# Patient Record
Sex: FEMALE | Employment: FULL TIME | URBAN - METROPOLITAN AREA
[De-identification: names, ages, dates, MRNs, and addresses within clinical notes are randomized per-mention and may not be internally consistent; named-entity substitution may affect disease eponyms.]

---

## 2017-04-02 ENCOUNTER — HOSPITAL ENCOUNTER (OUTPATIENT)
Age: 47
Discharge: HOME | End: 2017-04-02
Attending: EMERGENCY MEDICINE
Payer: COMMERCIAL

## 2017-04-02 VITALS
HEIGHT: 68 IN | OXYGEN SATURATION: 99 % | BODY MASS INDEX: 27.28 KG/M2 | RESPIRATION RATE: 20 BRPM | SYSTOLIC BLOOD PRESSURE: 113 MMHG | HEART RATE: 82 BPM | TEMPERATURE: 98 F | WEIGHT: 180 LBS | DIASTOLIC BLOOD PRESSURE: 62 MMHG

## 2017-04-02 DIAGNOSIS — L03.213 PERIORBITAL CELLULITIS OF LEFT EYE: ICD-10-CM

## 2017-04-02 DIAGNOSIS — H10.32 ACUTE BACTERIAL CONJUNCTIVITIS OF LEFT EYE: Primary | ICD-10-CM

## 2017-04-02 PROCEDURE — 99214 OFFICE O/P EST MOD 30 MIN: CPT

## 2017-04-02 PROCEDURE — 87430 STREP A AG IA: CPT

## 2017-04-02 PROCEDURE — 99213 OFFICE O/P EST LOW 20 MIN: CPT

## 2017-04-02 RX ORDER — CIPROFLOXACIN HYDROCHLORIDE 3.5 MG/ML
2 SOLUTION/ DROPS TOPICAL
Qty: 1 BOTTLE | Refills: 0 | Status: SHIPPED | OUTPATIENT
Start: 2017-04-02 | End: 2017-04-12

## 2017-04-02 RX ORDER — AMOXICILLIN AND CLAVULANATE POTASSIUM 875; 125 MG/1; MG/1
1 TABLET, FILM COATED ORAL 2 TIMES DAILY
Qty: 20 TABLET | Refills: 0 | Status: SHIPPED | OUTPATIENT
Start: 2017-04-02 | End: 2017-04-12

## 2017-04-02 NOTE — ED PROVIDER NOTES
Patient Seen in: [address]    History   Patient presents with:   Eye Visual Problem (opthalmic)    Stated Complaint: left eye swollen and red     HPI    Patient  Is a 47 y/o male who presents to immediate care complainin pressure. Eyes: Positive for discharge and redness. Negative for photophobia, itching and visual disturbance. Respiratory: Negative. Cardiovascular: Negative. Genitourinary: Negative. Neurological: Negative.         Positive for stated complai no cervical adenopathy. Neurological: She is alert and oriented to person, place, and time. Skin: Skin is warm. Nursing note and vitals reviewed.           ED Course     Labs Reviewed   [facility] POCT RAPID STREP - Normal       MDM           Disposition and

## 2017-04-02 NOTE — ED INITIAL ASSESSMENT (HPI)
Pt presents to the IC with c/o left eye swelling and clear drainage since yesterday. Pt just returned from vacation and notes it has gotten worse. +itching with goopy discharge. Pt denies the sensation of a FB.  Pt reports symptoms of a sore throat since ye

## 2018-02-21 ENCOUNTER — OFFICE VISIT (OUTPATIENT)
Dept: INTERNAL MEDICINE CLINIC | Facility: CLINIC | Age: 48
End: 2018-02-21

## 2018-02-21 VITALS
DIASTOLIC BLOOD PRESSURE: 74 MMHG | HEIGHT: 68 IN | SYSTOLIC BLOOD PRESSURE: 118 MMHG | OXYGEN SATURATION: 98 % | HEART RATE: 109 BPM | TEMPERATURE: 102 F | WEIGHT: 165 LBS | BODY MASS INDEX: 25.01 KG/M2

## 2018-02-21 DIAGNOSIS — J40 BRONCHITIS: Primary | ICD-10-CM

## 2018-02-21 PROCEDURE — 99213 OFFICE O/P EST LOW 20 MIN: CPT | Performed by: INTERNAL MEDICINE

## 2018-02-21 PROCEDURE — 99212 OFFICE O/P EST SF 10 MIN: CPT | Performed by: INTERNAL MEDICINE

## 2018-02-21 RX ORDER — TAMOXIFEN CITRATE 10 MG/1
10 TABLET ORAL 2 TIMES DAILY
COMMUNITY
End: 2018-09-12

## 2018-02-21 RX ORDER — ALBUTEROL SULFATE 90 UG/1
2 AEROSOL, METERED RESPIRATORY (INHALATION) EVERY 6 HOURS PRN
Qty: 1 INHALER | Refills: 0 | Status: SHIPPED | OUTPATIENT
Start: 2018-02-21 | End: 2018-09-12

## 2018-02-21 RX ORDER — LEVOFLOXACIN 500 MG/1
500 TABLET, FILM COATED ORAL DAILY
Qty: 7 TABLET | Refills: 0 | Status: SHIPPED | OUTPATIENT
Start: 2018-02-21 | End: 2018-09-12

## 2018-02-21 NOTE — PROGRESS NOTES
Mark Guerrero is a 50year old female.   Patient presents with:  Flu: started Monday night, chills, headach, fever and  body aches       HPI:   Pt comes as an urgent visit   C/c uri , sinus pressure   C/o uri   See ros   Past weekend was around sick people Ht 5' 8\" (1.727 m)   Wt 165 lb (74.8 kg)   SpO2 98%   BMI 25.09 kg/m²   GENERAL: well developed, well nourished,in mild distress   SKIN: no rashes,no suspicious lesions  HEENT: atraumatic, normocephalic,ears and throat are clear, + frontal sinus tenderne

## 2018-09-12 ENCOUNTER — OFFICE VISIT (OUTPATIENT)
Dept: INTERNAL MEDICINE CLINIC | Facility: CLINIC | Age: 48
End: 2018-09-12
Payer: COMMERCIAL

## 2018-09-12 VITALS
BODY MASS INDEX: 26.98 KG/M2 | DIASTOLIC BLOOD PRESSURE: 75 MMHG | RESPIRATION RATE: 17 BRPM | SYSTOLIC BLOOD PRESSURE: 111 MMHG | HEIGHT: 68 IN | HEART RATE: 62 BPM | TEMPERATURE: 98 F | WEIGHT: 178 LBS

## 2018-09-12 DIAGNOSIS — J02.0 PHARYNGITIS DUE TO STREPTOCOCCUS SPECIES: Primary | ICD-10-CM

## 2018-09-12 LAB
CONTROL LINE PRESENT WITH A CLEAR BACKGROUND (YES/NO): YES YES/NO
KIT LOT #: NORMAL NUMERIC

## 2018-09-12 PROCEDURE — 99213 OFFICE O/P EST LOW 20 MIN: CPT | Performed by: INTERNAL MEDICINE

## 2018-09-12 PROCEDURE — 87880 STREP A ASSAY W/OPTIC: CPT | Performed by: INTERNAL MEDICINE

## 2018-09-12 PROCEDURE — 99212 OFFICE O/P EST SF 10 MIN: CPT | Performed by: INTERNAL MEDICINE

## 2018-09-12 RX ORDER — ANASTROZOLE 1 MG/1
TABLET ORAL
COMMUNITY
Start: 2016-09-21

## 2018-09-12 NOTE — PROGRESS NOTES
Annette Valerio is a 46year old female.   Patient presents with:  Sore Throat  Sinusitis      HPI:   Pt comes as an urgent visit   C./c sore throat   C//o sore note  Had some sore throat and sinus tenderness for 2 days and sore throat more worse by 1 day  She she has some mild frontal sinus tenderness but no maxillary sinus tenderness  NECK: supple,no adenopathy, nontender  LUNGS: clear to auscultation, no wheeze  CARDIO: RRR without murmur    ASSESSMENT AND PLAN:   Diagnoses and all orders for this visit:    P

## (undated) NOTE — ED AVS SNAPSHOT
[facility] [address] [place] [address]    Phone:  [phone]    Fax:  [phone]           Annette Valerio   MRN: [id number]    Department:  [facility]   Date of Visit:  4/2/2 benefit level being available to you or other limited reimbursement. The physician may seek payment directly from you for amounts other than your deductible, co-payment, or co-insurance and for other services not covered under your health insurance plan. If you believe that any of the medications or instructions on this list is different from what your Primary Care doctor has instructed you - please continue to take your medications as instructed by your Primary Care doctor until you can check with your do [address] to help you get signed up for insurance coverage. Patient [address] is a Federal Navigator program that can help with your Affordable Care Act coverage, as well as all types of Medicaid plans.   To get signed up and covere

## (undated) NOTE — LETTER
2/21/2018          To Whom It May Concern:    Eva Theodore is currently under my medical care and may not return to work at this time. Jesus Duvall may return on Monday 02/26/2018. If you require additional information please contact our office.         Sincerel